# Patient Record
Sex: FEMALE | Race: WHITE | NOT HISPANIC OR LATINO | URBAN - NONMETROPOLITAN AREA
[De-identification: names, ages, dates, MRNs, and addresses within clinical notes are randomized per-mention and may not be internally consistent; named-entity substitution may affect disease eponyms.]

---

## 2017-09-14 ENCOUNTER — APPOINTMENT (OUTPATIENT)
Age: 82
Setting detail: DERMATOLOGY
End: 2017-09-14

## 2017-09-14 DIAGNOSIS — L85.3 XEROSIS CUTIS: ICD-10-CM

## 2017-09-14 DIAGNOSIS — L81.4 OTHER MELANIN HYPERPIGMENTATION: ICD-10-CM

## 2017-09-14 DIAGNOSIS — Z85.828 PERSONAL HISTORY OF OTHER MALIGNANT NEOPLASM OF SKIN: ICD-10-CM

## 2017-09-14 DIAGNOSIS — L82.0 INFLAMED SEBORRHEIC KERATOSIS: ICD-10-CM

## 2017-09-14 PROBLEM — D48.5 NEOPLASM OF UNCERTAIN BEHAVIOR OF SKIN: Status: ACTIVE | Noted: 2017-09-14

## 2017-09-14 PROCEDURE — OTHER COUNSELING: OTHER

## 2017-09-14 PROCEDURE — 11100: CPT

## 2017-09-14 PROCEDURE — 99202 OFFICE O/P NEW SF 15 MIN: CPT | Mod: 25

## 2017-09-14 PROCEDURE — 11101: CPT

## 2017-09-14 PROCEDURE — OTHER BIOPSY BY PUNCH METHOD: OTHER

## 2017-09-14 ASSESSMENT — LOCATION DETAILED DESCRIPTION DERM
LOCATION DETAILED: RIGHT SUPERIOR LATERAL UPPER BACK
LOCATION DETAILED: LEFT MEDIAL SUPERIOR CHEST
LOCATION DETAILED: LEFT SUPERIOR UPPER BACK
LOCATION DETAILED: LEFT INFERIOR POSTAURICULAR SKIN
LOCATION DETAILED: RIGHT MEDIAL EYEBROW

## 2017-09-14 ASSESSMENT — LOCATION SIMPLE DESCRIPTION DERM
LOCATION SIMPLE: RIGHT BACK
LOCATION SIMPLE: LEFT UPPER BACK
LOCATION SIMPLE: RIGHT EYEBROW
LOCATION SIMPLE: CHEST
LOCATION SIMPLE: SCALP

## 2017-09-14 ASSESSMENT — LOCATION ZONE DERM
LOCATION ZONE: TRUNK
LOCATION ZONE: FACE
LOCATION ZONE: SCALP

## 2017-09-14 NOTE — PROCEDURE: BIOPSY BY PUNCH METHOD
Suture Removal: 14 days
Bill For Surgical Tray: no
Wound Care: Bacitracin
Billing Type: Third-Party Bill
Epidermal Sutures: 6-0 Nylon
X Size Of Lesion In Cm (Optional): 0
Biopsy Type: H and E
Anesthesia Volume In Cc (Will Not Render If 0): 0.5
Anesthesia Type: 1% lidocaine with 1:100,000 epinephrine
Post-Care Instructions: I reviewed with the patient in detail post-care instructions. Patient is to keep the biopsy site dry overnight, and then apply bacitracin twice daily until healed. Patient may apply hydrogen peroxide soaks to remove any crusting.
Hemostasis: None
Consent: Written consent was obtained and risks were reviewed including but not limited to scarring, infection, bleeding, scabbing, incomplete removal, nerve damage and allergy to anesthesia.
Dressing: bandage
Punch Size In Mm: 3
Detail Level: Detailed
Home Suture Removal Text: Patient was provided a home suture removal kit and will remove their sutures at home.  If they have any questions or difficulties they will call the office.
Notification Instructions: Patient will be notified of biopsy results. However, patient instructed to call the office if not contacted within 2 weeks.

## 2017-09-14 NOTE — HPI: SKIN LESION
How Severe Is Your Skin Lesion?: moderate
Has Your Skin Lesion Been Treated?: not been treated
Is This A New Presentation, Or A Follow-Up?: Skin Lesion
Additional History: Patient states she is on blood thinners and if the place to the L post auricular needs to be removed she will have to talk to Dr Manuel ruiz.

## 2017-09-21 ENCOUNTER — APPOINTMENT (OUTPATIENT)
Age: 82
Setting detail: DERMATOLOGY
End: 2017-09-21

## 2017-09-21 DIAGNOSIS — Z48.02 ENCOUNTER FOR REMOVAL OF SUTURES: ICD-10-CM

## 2017-09-21 DIAGNOSIS — D18.0 HEMANGIOMA: ICD-10-CM

## 2017-09-21 PROBLEM — D18.01 HEMANGIOMA OF SKIN AND SUBCUTANEOUS TISSUE: Status: ACTIVE | Noted: 2017-09-21

## 2017-09-21 PROBLEM — C44.319 BASAL CELL CARCINOMA OF SKIN OF OTHER PARTS OF FACE: Status: ACTIVE | Noted: 2017-09-21

## 2017-09-21 PROCEDURE — 99024 POSTOP FOLLOW-UP VISIT: CPT

## 2017-09-21 PROCEDURE — OTHER REASSURANCE: OTHER

## 2017-09-21 PROCEDURE — OTHER CRYOSURGICAL DESTRUCTION: OTHER

## 2017-09-21 PROCEDURE — 17282 DSTR MAL LS F/E/E/N/L/M1.1-2: CPT

## 2017-09-21 PROCEDURE — OTHER SUTURE REMOVAL (GLOBAL PERIOD): OTHER

## 2017-09-21 PROCEDURE — OTHER PATHOLOGY DISCUSSION: OTHER

## 2017-09-21 ASSESSMENT — LOCATION SIMPLE DESCRIPTION DERM
LOCATION SIMPLE: SCALP
LOCATION SIMPLE: RIGHT EYEBROW

## 2017-09-21 ASSESSMENT — LOCATION ZONE DERM
LOCATION ZONE: FACE
LOCATION ZONE: SCALP

## 2017-09-21 ASSESSMENT — LOCATION DETAILED DESCRIPTION DERM
LOCATION DETAILED: RIGHT MEDIAL EYEBROW
LOCATION DETAILED: LEFT INFERIOR POSTAURICULAR SKIN

## 2017-09-21 NOTE — PROCEDURE: SUTURE REMOVAL (GLOBAL PERIOD)
Add 31376 Cpt? (Important Note: In 2017 The Use Of 12944 Is Being Tracked By Cms To Determine Future Global Period Reimbursement For Global Periods): yes
Detail Level: Detailed

## 2017-09-21 NOTE — PROCEDURE: CRYOSURGICAL DESTRUCTION
Bill As A Line Item Or As Units: Line Item
Size Of Lesion In Cm: 1.2
Total Time In Minutes: 0.5 minutes
Detail Level: Detailed
Post-Care Instructions: I reviewed with the patient in detail post-care instructions. Patient is to keep the area dry for 48 hours, and not to engage in any heavy lifting, exercise, or swimming for the next 14 days. Should the patient develop any fevers, chills, bleeding, severe pain patient will contact the office immediately.
Consent was obtained from the patient. The risks and benefits to therapy were discussed in detail. Specifically, the risks of infection, scarring, bleeding, prolonged wound healing, incomplete removal, allergy to anesthesia, nerve injury and recurrence were addressed. Alternatives to liquid nitrogen, such as ED&C, surgical removal, XRT were also discussed.  Prior to the procedure, the treatment site was clearly identified and confirmed by the patient. All components of Universal Protocol/PAUSE Rule completed.
Number Of Freeze-Thaw Cycles: 3 freeze-thaw cycles
Anesthesia Volume In Cc: 0
Pre-Procedure: The surgical site was antiseptically prepared.
Additional Information: (Optional): The wound was cleaned, and a dressing was applied.  The patient received detailed post-op instructions.

## 2017-10-06 ENCOUNTER — APPOINTMENT (OUTPATIENT)
Age: 82
Setting detail: DERMATOLOGY
End: 2017-10-06

## 2017-10-06 DIAGNOSIS — D22 MELANOCYTIC NEVI: ICD-10-CM

## 2017-10-06 DIAGNOSIS — Z85.828 PERSONAL HISTORY OF OTHER MALIGNANT NEOPLASM OF SKIN: ICD-10-CM

## 2017-10-06 DIAGNOSIS — L08.9 LOCAL INFECTION OF THE SKIN AND SUBCUTANEOUS TISSUE, UNSPECIFIED: ICD-10-CM

## 2017-10-06 PROBLEM — D22.9 MELANOCYTIC NEVI, UNSPECIFIED: Status: ACTIVE | Noted: 2017-10-06

## 2017-10-06 PROCEDURE — OTHER PRESCRIPTION: OTHER

## 2017-10-06 PROCEDURE — OTHER REASSURANCE: OTHER

## 2017-10-06 PROCEDURE — 99024 POSTOP FOLLOW-UP VISIT: CPT

## 2017-10-06 PROCEDURE — OTHER ADDITIONAL NOTES: OTHER

## 2017-10-06 RX ORDER — MUPIROCIN 20 MG/G
OINTMENT TOPICAL
Qty: 1 | Refills: 0 | Status: ERX | COMMUNITY
Start: 2017-10-06

## 2017-10-06 RX ORDER — SULFAMETHOXAZOLE AND TRIMETHOPRIM 800; 160 MG/1; MG/1
TABLET ORAL BID
Qty: 28 | Refills: 0 | Status: ERX | COMMUNITY
Start: 2017-10-06

## 2017-10-06 NOTE — PROCEDURE: ADDITIONAL NOTES
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 Site is now infected. Pt was given antibiotics  by mouth and a antibiotic cream. Pt is to return 11-8-17 to have site rechecked to make sure the liquid nitrogen destroyed the cancer cells

## 2017-11-14 ENCOUNTER — APPOINTMENT (OUTPATIENT)
Age: 82
Setting detail: DERMATOLOGY
End: 2017-11-14

## 2017-11-14 DIAGNOSIS — Z85.828 PERSONAL HISTORY OF OTHER MALIGNANT NEOPLASM OF SKIN: ICD-10-CM

## 2017-11-14 PROBLEM — D48.5 NEOPLASM OF UNCERTAIN BEHAVIOR OF SKIN: Status: ACTIVE | Noted: 2017-11-14

## 2017-11-14 PROCEDURE — 11100: CPT

## 2017-11-14 PROCEDURE — OTHER REASSURANCE: OTHER

## 2017-11-14 PROCEDURE — OTHER COUNSELING: OTHER

## 2017-11-14 PROCEDURE — 99213 OFFICE O/P EST LOW 20 MIN: CPT | Mod: 25

## 2017-11-14 PROCEDURE — OTHER BIOPSY BY PUNCH METHOD: OTHER

## 2017-11-14 NOTE — PROCEDURE: BIOPSY BY PUNCH METHOD
Dressing: bandage
Patient Will Remove Sutures At Home?: No
Biopsy Type: H and E
Detail Level: Detailed
Notification Instructions: Patient will be notified of biopsy results. However, patient instructed to call the office if not contacted within 2 weeks.
Hemostasis: None
Anesthesia Type: 1% lidocaine with epinephrine and a 1:10 solution of 8.4% sodium bicarbonate
Punch Size In Mm: 3
Billing Type: Third-Party Bill
Epidermal Sutures: 4-0 Nylon
Additional Anesthesia Volume In Cc (Will Not Render If 0): 0
Home Suture Removal Text: Patient was provided a home suture removal kit and will remove their sutures at home.  If they have any questions or difficulties they will call the office.
Anesthesia Volume In Cc (Will Not Render If 0): 0.5
Consent: Written consent was obtained and risks were reviewed including but not limited to scarring, infection, bleeding, scabbing, incomplete removal, nerve damage and allergy to anesthesia.
Wound Care: Bacitracin
Suture Removal: 10 days
Post-Care Instructions: I reviewed with the patient in detail post-care instructions. Patient is to keep the biopsy site dry overnight, and then apply bacitracin twice daily until healed. Patient may apply hydrogen peroxide and half water twice daily

## 2017-11-21 ENCOUNTER — APPOINTMENT (OUTPATIENT)
Age: 82
Setting detail: DERMATOLOGY
End: 2017-11-21

## 2017-11-21 DIAGNOSIS — Z48.02 ENCOUNTER FOR REMOVAL OF SUTURES: ICD-10-CM

## 2017-11-21 PROCEDURE — 99024 POSTOP FOLLOW-UP VISIT: CPT

## 2017-11-21 PROCEDURE — OTHER REASSURANCE: OTHER

## 2017-11-21 PROCEDURE — OTHER SUTURE REMOVAL (GLOBAL PERIOD): OTHER

## 2017-11-21 ASSESSMENT — LOCATION DETAILED DESCRIPTION DERM: LOCATION DETAILED: RIGHT MEDIAL EYEBROW

## 2017-11-21 ASSESSMENT — LOCATION ZONE DERM: LOCATION ZONE: FACE

## 2017-11-21 ASSESSMENT — LOCATION SIMPLE DESCRIPTION DERM: LOCATION SIMPLE: RIGHT EYEBROW

## 2017-11-21 NOTE — PROCEDURE: SUTURE REMOVAL (GLOBAL PERIOD)
Add 60186 Cpt? (Important Note: In 2017 The Use Of 67536 Is Being Tracked By Cms To Determine Future Global Period Reimbursement For Global Periods): yes
Detail Level: Detailed